# Patient Record
Sex: MALE | Race: BLACK OR AFRICAN AMERICAN | NOT HISPANIC OR LATINO | Employment: OTHER | URBAN - METROPOLITAN AREA
[De-identification: names, ages, dates, MRNs, and addresses within clinical notes are randomized per-mention and may not be internally consistent; named-entity substitution may affect disease eponyms.]

---

## 2017-08-22 ENCOUNTER — TELEPHONE (OUTPATIENT)
Dept: GASTROENTEROLOGY | Facility: CLINIC | Age: 67
End: 2017-08-22

## 2017-08-22 NOTE — TELEPHONE ENCOUNTER
----- Message from David Faith sent at 8/22/2017  9:51 AM CDT -----  Contact: Conor Go- [phone] (h) [phone] (c)  Needs to schedule colonoscipy

## 2017-08-24 ENCOUNTER — TELEPHONE (OUTPATIENT)
Dept: GASTROENTEROLOGY | Facility: CLINIC | Age: 67
End: 2017-08-24

## 2017-08-24 NOTE — TELEPHONE ENCOUNTER
Patient is scheduled for Colonoscopy at [facility] on 9/26/17,prep information was explained and mailed out. Patient verbalize understanding.

## 2017-08-24 NOTE — TELEPHONE ENCOUNTER
----- Message from Anitha Fulton sent at 8/24/2017  1:01 PM CDT -----  Contact: [facility], [phone]  States fax number provided to send medical records is not working. Please advise.

## 2017-09-29 ENCOUNTER — TELEPHONE (OUTPATIENT)
Dept: GASTROENTEROLOGY | Facility: CLINIC | Age: 67
End: 2017-09-29

## 2017-09-29 NOTE — TELEPHONE ENCOUNTER
----- Message from Hydrocapsule Clare sent at 9/28/2017  4:19 PM CDT -----  Contact: Dr. Hany Marie/[phone]  Dr. Marie requesting to speak with you concerning a report on this patient. Please call and advise.

## 2017-10-03 ENCOUNTER — OFFICE VISIT (OUTPATIENT)
Dept: GASTROENTEROLOGY | Facility: CLINIC | Age: 67
End: 2017-10-03
Payer: MEDICARE

## 2017-10-03 VITALS
BODY MASS INDEX: 29.26 KG/M2 | SYSTOLIC BLOOD PRESSURE: 137 MMHG | WEIGHT: 216 LBS | HEART RATE: 73 BPM | DIASTOLIC BLOOD PRESSURE: 82 MMHG | HEIGHT: 72 IN

## 2017-10-03 DIAGNOSIS — C18.7 MALIGNANT NEOPLASM OF SIGMOID COLON: Primary | ICD-10-CM

## 2017-10-03 PROCEDURE — 99214 OFFICE O/P EST MOD 30 MIN: CPT | Mod: S$GLB,,, | Performed by: INTERNAL MEDICINE

## 2017-10-03 PROCEDURE — 99999 PR PBB SHADOW E&M-EST. PATIENT-LVL II: CPT | Mod: PBBFAC,,, | Performed by: INTERNAL MEDICINE

## 2017-10-03 RX ORDER — AMLODIPINE BESYLATE 5 MG/1
5 TABLET ORAL DAILY
COMMUNITY

## 2017-10-03 RX ORDER — HYDRALAZINE HYDROCHLORIDE 50 MG/1
50 TABLET, FILM COATED ORAL 2 TIMES DAILY
COMMUNITY

## 2017-10-03 RX ORDER — SAW PALMETTO 160 MG
450 CAPSULE ORAL 2 TIMES DAILY
COMMUNITY

## 2017-10-03 RX ORDER — METOPROLOL TARTRATE 75 MG/1
50 TABLET, FILM COATED ORAL 2 TIMES DAILY
COMMUNITY

## 2017-10-03 RX ORDER — CHOLECALCIFEROL (VITAMIN D3) 25 MCG
2000 TABLET ORAL DAILY
COMMUNITY

## 2017-10-03 RX ORDER — AMOXICILLIN 500 MG
2 CAPSULE ORAL DAILY
COMMUNITY

## 2017-10-03 RX ORDER — METFORMIN HYDROCHLORIDE 500 MG/1
500 TABLET ORAL 2 TIMES DAILY WITH MEALS
COMMUNITY

## 2017-10-03 RX ORDER — HYDROCHLOROTHIAZIDE 25 MG/1
25 TABLET ORAL DAILY
COMMUNITY

## 2017-10-03 RX ORDER — LOSARTAN POTASSIUM 25 MG/1
25 TABLET ORAL DAILY
COMMUNITY

## 2017-10-03 RX ORDER — ASPIRIN 81 MG/1
81 TABLET ORAL DAILY
COMMUNITY

## 2017-10-03 NOTE — PATIENT INSTRUCTIONS
Colorectal Cancer Screening    Colorectal cancer (cancer in the colon or rectum) is a leading cause of cancer deaths in the [place] But it doesnt have to be. When this cancer is found and removed early, the chances of a full recovery are very good. Because colorectal cancer rarely causes symptoms in its early stages, screening for the disease is important. Its even more crucial if you have risk factors for the disease. Learn more about colorectal cancer and its risk factors. Then talk to your healthcare provider about being screened. You could be saving your own life.  Risk factors for colorectal cancer  Your risk of having colorectal cancer increases if you:  · Are 50 years of age or older  · Have a family history or personal history of colorectal cancer or polyps  · Have a personal history of type 2 diabetes, Crohns disease, or ulcerative colitis  · Have an inherited genetic syndrome like Nayak syndrome (also known as HNPCC) or familial adenomatous polyposis (FAP)  · Are very overweight  · Are not physically active  · Smoke  · Drink a lot of alcohol  · Eat a lot of red or processed meat  The colon and rectum  Waste from food you eat enters the colon from the small intestine. As it travels through the colon, the waste (stool) loses water and becomes more solid. Intestinal muscles push it toward the sigmoid--the last section of the colon. Stool then moves into the rectum, where its stored until its ready to leave the body during a bowel movement.  How cancer develops  Polyps are growths that form on the inner lining of the colon or rectum. Most are benign, which means they arent cancerous. But over time, some polyps can become cancer (malignant). This happens when cells in these polyps begin growing abnormally. In time, malignant cells invade more and more of the colon and rectum. The cancer may also spread to nearby organs or lymph nodes or to other parts of the body. Finding and removing polyps can help  prevent cancer from ever forming.  Your screening  Screening means looking for a health problem before you have symptoms. During screening for colorectal cancer, your healthcare provider will ask about your health history, examine you, and do one or more tests.  History and exam  The history and exam involve the following:  · Health history. Your healthcare provider will ask about your health history. Mention if a family member has had colon cancer or polyps. Also mention any health problems you have had in the past.  · Digital rectal exam (REMIGIO). During a REMIGIO, the healthcare provider inserts a lubricated gloved finger into the rectum. The test is painless and takes less than a minute. Healthcare providers agree that this test alone is not enough to screen for colorectal cancer.  Screening test choices  Fecal occult blood test (FOBT) or fecal immunochemical test (FIT)  These tests check for occult blood in stool (blood you cant see). Hidden blood may be a sign of colon polyps or cancer. A small sample of stool is tested for blood in a laboratory. Most often, you collect this sample at home using a kit your healthcare provider gives you. Follow the instructions carefully for using this kit. You might need to avoid certain foods and medicines before the test, as directed.  Barium enema with contrast (double-contrast barium enema)  This test uses X-rays to provide images of the entire colon and rectum. The day before this test, you will need to do a bowel prep to clean out the colon and rectum. A bowel prep is a liquid diet plus strong laxatives or enemas. You will be awake for the test, but you may be given medicine to help you relax. At the start of the test, a radiologist (a healthcare provider who specializes in imaging tests) places a soft tube into the rectum. The tube is used to fill the colon with a contrast liquid (barium) and air. This can be uncomfortable for some people. The liquid helps the colon show up  clearly on the X-rays. Because the test uses X-rays, it exposes you to a small amount of radiation.  Virtual colonoscopy  This exam is also called a CT colonography. It uses a series of X-ray photographs to create a 3-D view of the colon and rectum. The day before the test, you will need to do a bowel prep to clean out your colon. Your healthcare provider will give you instructions on how to do this. During the procedure, you will lie on a table that is part of a special X-ray machine called a CT scanner. A small tube will be placed into your rectum to fill the colon and rectum with air. This can be uncomfortable for some people. Then, the table will move into the machine and pictures will be taken of your colon and rectum. A computer will combine these photos to create a 3-D picture. Because the test uses X-rays, it exposes you to a small amount of radiation.  Scope exams  Here are two types of scope exams:  · Colonoscopy. This test can be used to find and remove polyps anywhere in the colon or rectum. The day before the test, you will do a bowel prep. This is a liquid diet plus a strong laxative solution or an enema. The bowel prep will cleanse your colon. You will be given instructions for this. Just before the test, you are given a medicine to make you sleepy. Then, a long, flexible, lighted tube called a colonoscope is gently inserted into the rectum and guided through the entire colon. Images of the colon are viewed on a video screen. Any polyps that are found are removed and sent to a lab for testing. If a polyp cant be removed, a sample of tissue is taken and the polyp might be removed later during surgery. You will need to bring someone with you to drive you home after this test.   · Sigmoidoscopy. This test is similar to colonoscopy, but focuses only on the sigmoid colon and rectum. As with colonoscopy, bowel prep must be done the day before this test. It might not need to be as complete as the bowel prep  for a colonoscopy. You are awake during the procedure, but you may be given medicine to help you relax. During the test, the healthcare provider guides a thin, flexible, lighted tube called a sigmoidoscope through your rectum and lower colon. The images are displayed on a video screen. Polyps are removed, if possible, and sent to a lab for testing.  Colonoscopy is the only screening test that lets your healthcare provider see the entire colon and rectum. This test also lets your healthcare provider remove any pieces of tissue that need to be looked at by a lab. If something suspicious is found using any other tests, you will likely need a colonoscopy.     When to call your healthcare provider after a test  Call your healthcare provider if you have any of the following after any screening test:  · Bleeding  · Fever of 100.4°F (38°C) or higher, or as directed by your healthcare provider  · Abdominal pain  · Vomiting   Date Last Reviewed: 11/4/2015  © 5066-3552 [employer]. [address] [place] [address], [place] [address]. All rights reserved. This information is not intended as a substitute for professional medical care. Always follow your healthcare professional's instructions.

## 2017-10-03 NOTE — PROGRESS NOTES
Subjective:      Patient ID: Conor Go is a 67 y.o. male.    Chief Complaint: Follow-up    HPI:   Patient presents for follow-up.  He underwent a routine follow-up colonoscopy September 26, 2017 at [facility].  A small right colon adenoma was removed.    A small flat polypoid lesion at 45 cm did not raise with saline injection.  The site was tattooed   Biopsies subsequently returned positive for moderately differentiated adenocarcinoma.  I reviewed these findings with the patient and his spouse today.  They utilize [facility] and we will select a surgeon from our [facility].  I provided a digital copy of the path report and will assist him in follow-up   Patient has a history of colon adenomas.  His prior colonoscopy was June 2012.  A small adenoma was removed.  GI systems review is negative.    Past history includes hypertension.  Prior hip replacement.  Takes low-dose aspirin.  Family history negative for GI neoplasm  Nonsmoker.  Nondrinker.    Review of patient's allergies indicates:  No Known Allergies  No past medical history on file.  Past Surgical History:   Procedure Laterality Date    COLONOSCOPY       No family history on file.  Social History     Social History    Marital status:      Spouse name: N/A    Number of children: N/A    Years of education: N/A     Occupational History    Not on file.     Social History Main Topics    Smoking status: Not on file    Smokeless tobacco: Not on file    Alcohol use Not on file    Drug use: Unknown    Sexual activity: Not on file     Other Topics Concern    Not on file     Social History Narrative    No narrative on file       Review of Systems:  Constitutional: Negative for appetite change.   HENT: Negative for trouble swallowing.   Eyes: Negative for photophobia.   Respiratory: Negative for cough and shortness of breath.   Cardiovascular: Negative for palpitations.   Gastrointestinal: See HPI  for details.  Genitourinary: Negative for frequency and hematuria.   Skin: Negative for rash.   Neurological: Negative for weakness and headaches.   Hematological: Negative.   Psychiatric/Behavioral: Negative for suicidal ideas and behavioral problems.     Objective:     /82 (BP Location: Right arm, Patient Position: Sitting)   Pulse 73   Ht 6' (1.829 m)   Wt 98 kg (216 lb)   BMI 29.29 kg/m²     Physical Exam:  Eyes: Pupils are equal, round, and reactive to light.   Neck: Supple. No mass  Cardiovascular: Regular rhythm . No murmur   Pulmonary/Chest: Lungs clear   Abdominal: Soft. No mass palpated. Nontender, no guarding. Positive bowel sounds   Musculoskeletal: No deformity. No edema.   Psychiatric: Alert and oriented    Assessment:     1. Malignant neoplasm of sigmoid colon      Plan:     Conor was seen today for follow-up.    Diagnoses and all orders for this visit:    Malignant neoplasm of sigmoid colon      Plan:  They indicate they have chosen [facility] [place] for surgery  provided patient with appropriate reports.  Will defer CT scan for the appointment with a surgeon    CC Dr. Hernandez

## 2017-10-04 ENCOUNTER — TELEPHONE (OUTPATIENT)
Dept: GASTROENTEROLOGY | Facility: CLINIC | Age: 67
End: 2017-10-04

## 2017-10-04 NOTE — TELEPHONE ENCOUNTER
Patient was seen in the office on 10/3/17 and received test results from  from Pathology results and given a copy of his report was given. A copy of the Pathology results was scan in under media.

## 2017-12-25 ENCOUNTER — HOSPITAL ENCOUNTER (EMERGENCY)
Facility: HOSPITAL | Age: 67
Discharge: HOME | End: 2017-12-25
Attending: EMERGENCY MEDICINE
Payer: MEDICARE

## 2017-12-25 VITALS
WEIGHT: 205 LBS | OXYGEN SATURATION: 98 % | BODY MASS INDEX: 27.77 KG/M2 | RESPIRATION RATE: 17 BRPM | DIASTOLIC BLOOD PRESSURE: 85 MMHG | HEIGHT: 72 IN | HEART RATE: 80 BPM | TEMPERATURE: 99 F | SYSTOLIC BLOOD PRESSURE: 130 MMHG

## 2017-12-25 DIAGNOSIS — J11.1 INFLUENZA: ICD-10-CM

## 2017-12-25 DIAGNOSIS — J06.9 UPPER RESPIRATORY TRACT INFECTION, UNSPECIFIED TYPE: Primary | ICD-10-CM

## 2017-12-25 DIAGNOSIS — R05.9 COUGH: ICD-10-CM

## 2017-12-25 LAB
FLUAV AG SPEC QL IA: POSITIVE
FLUBV AG SPEC QL IA: NEGATIVE
SPECIMEN SOURCE: ABNORMAL

## 2017-12-25 PROCEDURE — 63600175 PHARM REV CODE 636 W HCPCS: Performed by: EMERGENCY MEDICINE

## 2017-12-25 PROCEDURE — 99283 EMERGENCY DEPT VISIT LOW MDM: CPT | Mod: 25

## 2017-12-25 PROCEDURE — 25000003 PHARM REV CODE 250: Performed by: EMERGENCY MEDICINE

## 2017-12-25 PROCEDURE — 96372 THER/PROPH/DIAG INJ SC/IM: CPT

## 2017-12-25 PROCEDURE — 87400 INFLUENZA A/B EACH AG IA: CPT | Mod: 59

## 2017-12-25 RX ORDER — ONDANSETRON 4 MG/1
4 TABLET, FILM COATED ORAL EVERY 6 HOURS PRN
Qty: 10 TABLET | Refills: 0 | Status: SHIPPED | OUTPATIENT
Start: 2017-12-25

## 2017-12-25 RX ORDER — ONDANSETRON 4 MG/1
4 TABLET, ORALLY DISINTEGRATING ORAL
Status: COMPLETED | OUTPATIENT
Start: 2017-12-25 | End: 2017-12-25

## 2017-12-25 RX ORDER — ACETAMINOPHEN 500 MG
1000 TABLET ORAL
Status: COMPLETED | OUTPATIENT
Start: 2017-12-25 | End: 2017-12-25

## 2017-12-25 RX ORDER — BENZONATATE 100 MG/1
100 CAPSULE ORAL 3 TIMES DAILY PRN
Qty: 14 CAPSULE | Refills: 0 | Status: SHIPPED | OUTPATIENT
Start: 2017-12-25

## 2017-12-25 RX ORDER — KETOROLAC TROMETHAMINE 30 MG/ML
30 INJECTION, SOLUTION INTRAMUSCULAR; INTRAVENOUS
Status: COMPLETED | OUTPATIENT
Start: 2017-12-25 | End: 2017-12-25

## 2017-12-25 RX ADMIN — ONDANSETRON 4 MG: 4 TABLET, ORALLY DISINTEGRATING ORAL at 05:12

## 2017-12-25 RX ADMIN — KETOROLAC TROMETHAMINE 30 MG: 30 INJECTION, SOLUTION INTRAMUSCULAR at 05:12

## 2017-12-25 RX ADMIN — ACETAMINOPHEN 1000 MG: 500 TABLET ORAL at 05:12

## 2017-12-25 NOTE — ED PROVIDER NOTES
Encounter Date: 12/25/2017    SCRIBE #1 NOTE: I, Zeferino Bernal, am scribing for, and in the presence of,  Dr. Tony Conde. I have scribed the entire note.       History     Chief Complaint   Patient presents with    Influenza     67y M ambulatory to ED with c/o cough, nasal congestion x 3 days. N/V and fever starting yesterday. pt reports chronic abdominal pain from colon resection for colon CA in October. pt has not started chemo yet     67-year-old male presents emergency department complaining of nasal congestion, headache, sore throat, myalgias, cough.  He reports onset about 3 days ago.  Reports it is worsened over the past 1-2 days.  Minimal improvement with over-the-counter medications.  Reports his headache is worse with cough.  It began gradually and is now a generalized, throbbing headache that is constant without any other exacerbating alleviating factors reported.  Reports a sore throat that is worse with cough and swelling.  No alleviating factors reported.  Reports his cough is minimally productive of clear sputum.  He reports nausea and decreased appetite with one or 2 episodes of nonbloody, nonbilious emesis yesterday.  Reports subjective fever at home unmeasured.  No other symptoms reported.  Denies any lightheadedness, dizziness, chest pain, shortness of breath.       The history is provided by the patient.     Review of patient's allergies indicates:  No Known Allergies  Past Medical History:   Diagnosis Date    Cancer     colon    Diabetes mellitus     Hypertension      Past Surgical History:   Procedure Laterality Date    COLON SURGERY      resection 10/2017    COLONOSCOPY      JOINT REPLACEMENT      hip     History reviewed. No pertinent family history.  Social History   Substance Use Topics    Smoking status: Never Smoker    Smokeless tobacco: Not on file    Alcohol use No     Review of Systems   Constitutional: Positive for fever (subjective). Negative for chills and fatigue.         Malaise   HENT: Positive for congestion, sinus pain, sinus pressure and sore throat. Negative for voice change.    Eyes: Negative for photophobia, pain and redness.   Respiratory: Positive for cough. Negative for choking and shortness of breath.    Cardiovascular: Negative for chest pain, palpitations and leg swelling.   Gastrointestinal: Positive for nausea and vomiting. Negative for abdominal pain, blood in stool and diarrhea.   Genitourinary: Negative for dysuria, frequency and urgency.   Musculoskeletal: Negative for back pain, neck pain and neck stiffness.   Neurological: Positive for headaches. Negative for seizures, speech difficulty, light-headedness and numbness.   All other systems reviewed and are negative.      Physical Exam     Initial Vitals [12/25/17 0449]   BP Pulse Resp Temp SpO2   138/79 82 18 99.5 °F (37.5 °C) 97 %      MAP       98.67         Physical Exam    Nursing note and vitals reviewed.  Constitutional: He appears well-developed and well-nourished. No distress.   HENT:   Head: Normocephalic and atraumatic.   Erythematous oropharynx, no exudate, no swelling, Dry MM   Eyes: Conjunctivae and EOM are normal. Pupils are equal, round, and reactive to light.   Neck: Normal range of motion. Neck supple. No tracheal deviation present.   Cardiovascular: Normal rate, regular rhythm, normal heart sounds and intact distal pulses.   Pulmonary/Chest: Breath sounds normal. No respiratory distress. He has no wheezes. He has no rhonchi. He has no rales.   Abdominal: Soft. Bowel sounds are normal. He exhibits no distension. There is no tenderness. There is no rebound and no guarding.   Musculoskeletal: Normal range of motion. He exhibits no tenderness.   Neurological: He is alert and oriented to person, place, and time. He has normal strength. No cranial nerve deficit or sensory deficit.   Skin: Skin is warm and dry. Capillary refill takes less than 2 seconds.   Psychiatric: He has a normal mood and  affect.         ED Course   Procedures  Labs Reviewed   INFLUENZA A AND B ANTIGEN          X-Rays:   Independently Interpreted Readings:   Chest X-Ray: Chest x-ray interpreted by radiologist and visualized by me:     Imaging Results          X-Ray Chest PA And Lateral (Final result)  Result time 12/25/17 05:30:14    Final result by Stephanie Spicer MD (12/25/17 05:30:14)                 Impression:      No evidence of acute intrathoracic process on this single radiographic view of the chest.      Electronically signed by: STEPHANIE SPICER  Date:     12/25/17  Time:    05:30              Narrative:    Comparison: None available    Technique: Single AP chest radiograph.    Findings: There is a right-sided chest port in place with catheter tip projecting over the SVC. The visualized airway is unremarkable.  The lungs appear symmetrically aerated without definite focal alveolar consolidation.  There is minimal bibasilar atelectasis No large pleural effusion or pneumothorax is appreciated.  Osseous structures demonstrate no acute abnormality.                              Medical Decision Making:   Initial Assessment:   66 y/o M presents to the ED c/o subjective fever, cough, congestion, myalgias, headache  Differential Diagnosis:   Pneumonia, dehydration, URI, influenza  Independently Interpreted Test(s):   I have ordered and independently interpreted X-rays - see prior notes.  Clinical Tests:   Lab Tests: Reviewed       <> Summary of Lab: Flu+  ED Management:  Patient given Zofran, Tylenol, Toradol.  He is feeling much better.  We discussed disposition including discharge with prescriptions for Tessalon and Zofran, instructions to increase oral hydration with Gatorade G2 or Powerade light, follow-up with primary care physician, return with any new or worsening symptoms.  Patient expressed good understanding and is comfortable with discharge at this time.                      ED Course      Clinical Impression:   The primary  encounter diagnosis was Upper respiratory tract infection, unspecified type. Diagnoses of Cough and Influenza were also pertinent to this visit.    Disposition:   Disposition: Discharged  Condition: Stable   I, Dr. Tony Conde, personally performed the services described in this documentation. All medical record entries made by the scribe were at my direction and in my presence.  I have reviewed the chart and agree that the record reflects my personal performance and is accurate and complete. Tony Conde MD.  5:40 AM 12/25/2017                          Tony Conde MD  12/25/17 0613

## 2017-12-25 NOTE — ED NOTES
Pt. ambulatory to ED room with steady gait. Reports cough, cold, congestion and subjective fever x2 days. Took OTC analgesics PTA. One episode of n.v last night. Emesis consisted of undigested soup. Reports decreased appetite and fluid intake. Denies diarrhea. Reports regular BMs. Denies CP. Denies SOB. Denies any pain at present time. Denies abd pain at present time. Denies HA. Denies urinary symptoms. Denies sick contacts. NAD.   Hx of colon CA. Had bowel resection. Incision above umbilicus. Incision is well healed. No redness, swelling, warmth or pain near incision. Dry clean dressing intact. Dressing consist of band aid. Denies abd pain with palpation.

## 2017-12-25 NOTE — ED NOTES
Educated pt. on the prevention, transmission, signs symptoms and treatment of the flu. Pt and wife verbalize understanding.

## 2017-12-25 NOTE — ED NOTES
Educated parents at bedside on medication indication, AE, SE, reactions and expected outcomes. Parents verbalize understanding. Agrees to treatment plan. Denies allergy. Will continue to monitor.

## 2018-04-23 ENCOUNTER — OFFICE VISIT (OUTPATIENT)
Dept: OTOLARYNGOLOGY | Facility: CLINIC | Age: 68
End: 2018-04-23
Payer: MEDICARE

## 2018-04-23 VITALS
HEIGHT: 72 IN | WEIGHT: 192 LBS | DIASTOLIC BLOOD PRESSURE: 75 MMHG | BODY MASS INDEX: 26.01 KG/M2 | HEART RATE: 57 BPM | SYSTOLIC BLOOD PRESSURE: 119 MMHG | TEMPERATURE: 98 F

## 2018-04-23 DIAGNOSIS — H93.8X3 EAR FULLNESS, BILATERAL: ICD-10-CM

## 2018-04-23 DIAGNOSIS — H61.23 BILATERAL IMPACTED CERUMEN: ICD-10-CM

## 2018-04-23 PROCEDURE — 99213 OFFICE O/P EST LOW 20 MIN: CPT | Mod: S$GLB,,, | Performed by: OTOLARYNGOLOGY

## 2018-04-23 RX ORDER — FLUOROURACIL 50 MG/ML
800 INJECTION, SOLUTION INTRAVENOUS WEEKLY
COMMUNITY

## 2018-04-23 RX ORDER — FOLIC ACID 0.8 MG
800 TABLET ORAL DAILY
COMMUNITY

## 2018-04-23 RX ORDER — LEUCOVORIN CALCIUM 100 MG/10ML
800 INJECTION, POWDER, LYOPHILIZED, FOR SOLUTION INTRAMUSCULAR; INTRAVENOUS WEEKLY
COMMUNITY

## 2018-04-23 NOTE — PATIENT INSTRUCTIONS
Ear care reviewed and okay to use OTC Debrox every few days.  Follow up in one year unless problems prior.

## 2018-04-23 NOTE — PROGRESS NOTES
Subjective:       Patient ID: Conor Go is a 67 y.o. male.    Chief Complaint: Ear Fullness (yearly check)    He is an existing patient of mine last seen in April 2017.  He is here today with his wife for evaluation of his ears.  He has noted gradually decreasing / muffled hearing over the last several weeks.  There is a history of excessive cerumen and cerumen impactions in the past.  There has been no otalgia or otorrhea.  He states previously after ear cleaning his hearing is restored.  He denies acoustical trauma or tinnitus.  He denies any nasal throat or other otolaryngologic complaints.  Since his last visit with me he has unfortunately been diagnosed with colon cancer for which he is currently on a chemotherapeutic protocol at [facility] [place] and states doing very well with this.          Review of Systems   Ears: Positive for ear pressure.  Negative for hearing loss, ear pain, ringing in ear, ear discharge, ear infections, dizziness, head trauma, taken gentramycin/streptomycin and family history of hearing loss.    Nose:  Negative for nosebleeds, nasal obstruction, loss of smell and postnasal drip.    Mouth/Throat: Negative for pain swallowing, impaired swallowing, hoarse voice, throat mass, neck mass, oral ulcers and neck lumps.   Constitutional: Negative for recent unexplained weight loss and fever.    Eyes:  Negative for visual change.   Cardiovascular:  Negative for chest pain and palpitations.   Respiratory:  Negative for asthma, emphysema, history of tuberculosis, recent cough and shortness of breath.    Gastrointestinal:  Negative for history of stomach ulcers or pain, acid reflux and indigestion.   Other:  Negative for kidney problem, bladder problem, arthritis, rash, weakness, disturbances in coordination, slurred, confusion, anemia and persistent infections.           Objective:        Vitals:    04/23/18 1426   BP: 119/75   Pulse: (!) 57   Temp: 97.8 °F (36.6 °C)      Body mass index is 26.04 kg/m².  Physical Exam   Constitutional: He is oriented to person, place, and time. He appears well-developed and well-nourished. No distress.   HENT:   Head: Normocephalic and atraumatic.   Right Ear: Tympanic membrane and external ear normal.   Left Ear: Tympanic membrane and external ear normal.   Nose: No mucosal edema, rhinorrhea or nasal deformity. No epistaxis.   Mouth/Throat: Oropharynx is clear and moist. No oropharyngeal exudate.   Bilateral cerumen impaction.   Eyes: Conjunctivae and EOM are normal. Right eye exhibits no discharge. Left eye exhibits no discharge. No scleral icterus.   Neck: Normal range of motion. Neck supple. No tracheal deviation present. No thyromegaly present.   Pulmonary/Chest: Effort normal. No stridor. No respiratory distress.   Musculoskeletal: Normal range of motion. He exhibits no deformity.   Lymphadenopathy:     He has no cervical adenopathy.   Neurological: He is alert and oriented to person, place, and time. He displays no weakness. No cranial nerve deficit. Coordination normal.   Skin: Skin is warm and dry. No rash noted. He is not diaphoretic.   Psychiatric: He has a normal mood and affect. His behavior is normal. His speech is not slurred.       Tests / Results:        Assessment:       1. Ear fullness, bilateral    2. Bilateral impacted cerumen        Plan:        Ears cleaned with curet bilaterally and states ears feel better and open and clear now.  Ear care reviewed and answered questions.  Okay to use OTC Debrox ear wax drops every few days.  Follow up in one year and as needed.

## 2021-01-07 ENCOUNTER — OFFICE VISIT (OUTPATIENT)
Dept: OTOLARYNGOLOGY | Facility: CLINIC | Age: 71
End: 2021-01-07
Payer: MEDICARE

## 2021-01-07 VITALS
DIASTOLIC BLOOD PRESSURE: 82 MMHG | WEIGHT: 212.63 LBS | HEIGHT: 72 IN | HEART RATE: 62 BPM | SYSTOLIC BLOOD PRESSURE: 143 MMHG | BODY MASS INDEX: 28.8 KG/M2 | TEMPERATURE: 98 F

## 2021-01-07 DIAGNOSIS — H61.23 BILATERAL IMPACTED CERUMEN: ICD-10-CM

## 2021-01-07 DIAGNOSIS — H93.8X3 STUFFY EARS, BILATERAL: ICD-10-CM

## 2021-01-07 PROCEDURE — 99213 PR OFFICE/OUTPT VISIT, EST, LEVL III, 20-29 MIN: ICD-10-PCS | Mod: S$GLB,,, | Performed by: OTOLARYNGOLOGY

## 2021-01-07 PROCEDURE — 99213 OFFICE O/P EST LOW 20 MIN: CPT | Mod: S$GLB,,, | Performed by: OTOLARYNGOLOGY

## 2021-01-07 PROCEDURE — 1159F MED LIST DOCD IN RCRD: CPT | Mod: S$GLB,,, | Performed by: OTOLARYNGOLOGY

## 2021-01-07 PROCEDURE — 3008F BODY MASS INDEX DOCD: CPT | Mod: CPTII,S$GLB,, | Performed by: OTOLARYNGOLOGY

## 2021-01-07 PROCEDURE — 1159F PR MEDICATION LIST DOCUMENTED IN MEDICAL RECORD: ICD-10-PCS | Mod: S$GLB,,, | Performed by: OTOLARYNGOLOGY

## 2021-01-07 PROCEDURE — 3008F PR BODY MASS INDEX (BMI) DOCUMENTED: ICD-10-PCS | Mod: CPTII,S$GLB,, | Performed by: OTOLARYNGOLOGY

## 2021-01-07 RX ORDER — ESCITALOPRAM OXALATE 10 MG/1
10 TABLET ORAL DAILY
COMMUNITY

## 2022-04-06 ENCOUNTER — OFFICE VISIT (OUTPATIENT)
Dept: OTOLARYNGOLOGY | Facility: CLINIC | Age: 72
End: 2022-04-06
Payer: MEDICARE

## 2022-04-06 VITALS
SYSTOLIC BLOOD PRESSURE: 121 MMHG | DIASTOLIC BLOOD PRESSURE: 71 MMHG | TEMPERATURE: 98 F | BODY MASS INDEX: 28.76 KG/M2 | WEIGHT: 212.38 LBS | HEIGHT: 72 IN | HEART RATE: 75 BPM

## 2022-04-06 DIAGNOSIS — H91.93 DIFFICULTY HEARING, BILATERAL: ICD-10-CM

## 2022-04-06 DIAGNOSIS — H61.23 BILATERAL IMPACTED CERUMEN: ICD-10-CM

## 2022-04-06 DIAGNOSIS — Z77.122 HISTORY OF EXPOSURE TO NOISE: ICD-10-CM

## 2022-04-06 DIAGNOSIS — H93.8X3 STUFFY EARS, BILATERAL: ICD-10-CM

## 2022-04-06 PROCEDURE — 1160F PR REVIEW ALL MEDS BY PRESCRIBER/CLIN PHARMACIST DOCUMENTED: ICD-10-PCS | Mod: CPTII,S$GLB,, | Performed by: OTOLARYNGOLOGY

## 2022-04-06 PROCEDURE — 99213 PR OFFICE/OUTPT VISIT, EST, LEVL III, 20-29 MIN: ICD-10-PCS | Mod: S$GLB,,, | Performed by: OTOLARYNGOLOGY

## 2022-04-06 PROCEDURE — 99213 OFFICE O/P EST LOW 20 MIN: CPT | Mod: S$GLB,,, | Performed by: OTOLARYNGOLOGY

## 2022-04-06 PROCEDURE — 3078F PR MOST RECENT DIASTOLIC BLOOD PRESSURE < 80 MM HG: ICD-10-PCS | Mod: CPTII,S$GLB,, | Performed by: OTOLARYNGOLOGY

## 2022-04-06 PROCEDURE — 3074F PR MOST RECENT SYSTOLIC BLOOD PRESSURE < 130 MM HG: ICD-10-PCS | Mod: CPTII,S$GLB,, | Performed by: OTOLARYNGOLOGY

## 2022-04-06 PROCEDURE — 1159F MED LIST DOCD IN RCRD: CPT | Mod: CPTII,S$GLB,, | Performed by: OTOLARYNGOLOGY

## 2022-04-06 PROCEDURE — 3078F DIAST BP <80 MM HG: CPT | Mod: CPTII,S$GLB,, | Performed by: OTOLARYNGOLOGY

## 2022-04-06 PROCEDURE — 3074F SYST BP LT 130 MM HG: CPT | Mod: CPTII,S$GLB,, | Performed by: OTOLARYNGOLOGY

## 2022-04-06 PROCEDURE — 3008F PR BODY MASS INDEX (BMI) DOCUMENTED: ICD-10-PCS | Mod: CPTII,S$GLB,, | Performed by: OTOLARYNGOLOGY

## 2022-04-06 PROCEDURE — 1160F RVW MEDS BY RX/DR IN RCRD: CPT | Mod: CPTII,S$GLB,, | Performed by: OTOLARYNGOLOGY

## 2022-04-06 PROCEDURE — 1159F PR MEDICATION LIST DOCUMENTED IN MEDICAL RECORD: ICD-10-PCS | Mod: CPTII,S$GLB,, | Performed by: OTOLARYNGOLOGY

## 2022-04-06 PROCEDURE — 3008F BODY MASS INDEX DOCD: CPT | Mod: CPTII,S$GLB,, | Performed by: OTOLARYNGOLOGY

## 2022-04-07 NOTE — PROGRESS NOTES
Subjective:       Patient ID: Conor Go is a 71 y.o. male.    Chief Complaint: Ear Fullness (Add on/fit in / check, clean ears.)    Walk-in today with his wife, states thought he had an appointment to have his ears checked and cleaned.  Has history of recurring cerumen impactions, last seen for this and cleaned January 2021, after which reported resolution of ear complaints and deferred audiogram.  Has had gradual recurrence of stuffy, blocked ears over the past several months, and wife complains he's not hearing well.  Occasionally uses Debrox.  Wears ear buds regularly.  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  No otalgia, otorrhea, tinnitus, fluctuations, spinning.  No nasal or throat or other otolaryngologic complaints.            Review of Systems     Constitutional: Negative for fever.      HENT: Positive for hearing loss.  Negative for ear discharge, ear pain, ringing in the ears, sore throat, stuffy nose and voice change.      Respiratory:  Negative for cough and shortness of breath.      Cardiovascular:  Negative for chest pain.     Neurological: Negative for dizziness.      Hematologic: Negative for swollen glands.                Objective:        Vitals:    04/06/22 1328   BP: 121/71   Pulse: 75   Temp: 97.9 °F (36.6 °C)     Body mass index is 28.81 kg/m².  Physical Exam  Constitutional:       General: He is not in acute distress.     Appearance: He is well-developed.   HENT:      Head: Normocephalic and atraumatic.      Right Ear: Tympanic membrane and external ear normal. There is impacted cerumen.      Left Ear: Tympanic membrane and external ear normal. There is impacted cerumen.      Nose: No nasal deformity, mucosal edema or rhinorrhea.      Mouth/Throat:      Mouth: Mucous membranes are moist.      Pharynx: No pharyngeal swelling, oropharyngeal exudate or posterior oropharyngeal erythema.   Neck:      Trachea: Phonation normal.   Pulmonary:      Effort:  Pulmonary effort is normal. No respiratory distress.   Lymphadenopathy:      Cervical: No cervical adenopathy.   Skin:     General: Skin is warm and dry.   Neurological:      Mental Status: He is alert and oriented to person, place, and time.   Psychiatric:         Speech: Speech normal.         Behavior: Behavior normal.         Tests / Results:        Assessment:       1. Stuffy ears, bilateral    2. Difficulty hearing, bilateral    3. Bilateral impacted cerumen    4. History of exposure to noise        Plan:       Bilateral cerumen impactions were cleared with a combination of micro instrumentation and tolerated well revealing otherwise canals and TMs within normal limits AU.  Audiogram discussed and will return for this with follow-up next few weeks.

## 2022-05-02 ENCOUNTER — OFFICE VISIT (OUTPATIENT)
Dept: OTOLARYNGOLOGY | Facility: CLINIC | Age: 72
End: 2022-05-02
Payer: MEDICARE

## 2022-05-02 ENCOUNTER — CLINICAL SUPPORT (OUTPATIENT)
Dept: OTOLARYNGOLOGY | Facility: CLINIC | Age: 72
End: 2022-05-02
Payer: MEDICARE

## 2022-05-02 VITALS
BODY MASS INDEX: 28.86 KG/M2 | HEART RATE: 57 BPM | WEIGHT: 212.81 LBS | SYSTOLIC BLOOD PRESSURE: 133 MMHG | DIASTOLIC BLOOD PRESSURE: 78 MMHG | TEMPERATURE: 98 F

## 2022-05-02 DIAGNOSIS — Z86.69 HISTORY OF IMPACTED CERUMEN: ICD-10-CM

## 2022-05-02 DIAGNOSIS — H90.41 SENSORINEURAL HEARING LOSS (SNHL) OF RIGHT EAR WITH UNRESTRICTED HEARING OF LEFT EAR: Primary | ICD-10-CM

## 2022-05-02 DIAGNOSIS — Z77.122 HISTORY OF EXPOSURE TO NOISE: ICD-10-CM

## 2022-05-02 DIAGNOSIS — H90.41 SENSORINEURAL HEARING LOSS (SNHL) OF RIGHT EAR WITH UNRESTRICTED HEARING OF LEFT EAR: ICD-10-CM

## 2022-05-02 PROCEDURE — 3008F PR BODY MASS INDEX (BMI) DOCUMENTED: ICD-10-PCS | Mod: CPTII,S$GLB,, | Performed by: OTOLARYNGOLOGY

## 2022-05-02 PROCEDURE — 92567 TYMPANOMETRY: CPT | Mod: S$GLB,,, | Performed by: AUDIOLOGIST

## 2022-05-02 PROCEDURE — 1159F PR MEDICATION LIST DOCUMENTED IN MEDICAL RECORD: ICD-10-PCS | Mod: CPTII,S$GLB,, | Performed by: OTOLARYNGOLOGY

## 2022-05-02 PROCEDURE — 1159F MED LIST DOCD IN RCRD: CPT | Mod: CPTII,S$GLB,, | Performed by: OTOLARYNGOLOGY

## 2022-05-02 PROCEDURE — 3078F DIAST BP <80 MM HG: CPT | Mod: CPTII,S$GLB,, | Performed by: OTOLARYNGOLOGY

## 2022-05-02 PROCEDURE — 92567 PR TYMPA2METRY: ICD-10-PCS | Mod: S$GLB,,, | Performed by: AUDIOLOGIST

## 2022-05-02 PROCEDURE — 1160F PR REVIEW ALL MEDS BY PRESCRIBER/CLIN PHARMACIST DOCUMENTED: ICD-10-PCS | Mod: CPTII,S$GLB,, | Performed by: OTOLARYNGOLOGY

## 2022-05-02 PROCEDURE — 92557 PR COMPREHENSIVE HEARING TEST: ICD-10-PCS | Mod: S$GLB,,, | Performed by: AUDIOLOGIST

## 2022-05-02 PROCEDURE — 99214 PR OFFICE/OUTPT VISIT, EST, LEVL IV, 30-39 MIN: ICD-10-PCS | Mod: S$GLB,,, | Performed by: OTOLARYNGOLOGY

## 2022-05-02 PROCEDURE — 92557 COMPREHENSIVE HEARING TEST: CPT | Mod: S$GLB,,, | Performed by: AUDIOLOGIST

## 2022-05-02 PROCEDURE — 1160F RVW MEDS BY RX/DR IN RCRD: CPT | Mod: CPTII,S$GLB,, | Performed by: OTOLARYNGOLOGY

## 2022-05-02 PROCEDURE — 3075F PR MOST RECENT SYSTOLIC BLOOD PRESS GE 130-139MM HG: ICD-10-PCS | Mod: CPTII,S$GLB,, | Performed by: OTOLARYNGOLOGY

## 2022-05-02 PROCEDURE — 3075F SYST BP GE 130 - 139MM HG: CPT | Mod: CPTII,S$GLB,, | Performed by: OTOLARYNGOLOGY

## 2022-05-02 PROCEDURE — 3078F PR MOST RECENT DIASTOLIC BLOOD PRESSURE < 80 MM HG: ICD-10-PCS | Mod: CPTII,S$GLB,, | Performed by: OTOLARYNGOLOGY

## 2022-05-02 PROCEDURE — 3008F BODY MASS INDEX DOCD: CPT | Mod: CPTII,S$GLB,, | Performed by: OTOLARYNGOLOGY

## 2022-05-02 PROCEDURE — 99214 OFFICE O/P EST MOD 30 MIN: CPT | Mod: S$GLB,,, | Performed by: OTOLARYNGOLOGY

## 2022-05-02 NOTE — PROGRESS NOTES
Conor Go, a 71 y.o. male, was seen today in the clinic for an audiologic evaluation.  Patients main complaint was hearing loss.  Mr. Go reported that he has family members who feel that he is not hearing as well as he once did.  He denied otalgia, aural fullness, tinnitus, and vertigo.    Tympanometry revealed Type A in the right ear and Type A in the left ear. Audiogram results revealed mild sensorineural hearing loss (SNHL) raising to normal hearing in the right ear and normal hearing in the left ear.  Speech reception thresholds were noted at 25 dB in the right ear and 20 dB in the left ear.  Speech discrimination scores were 96% in the right ear and 100% in the left ear.    Recommendations:  1. Otologic evaluation  2. Annual audiogram  3. Hearing protection when in noise

## 2022-06-20 NOTE — PROGRESS NOTES
Subjective:       Patient ID: Conor Go is a 71 y.o. male.    Chief Complaint: Follow-up (and audio)    Last seen 04/06/2022 with history as follows:    Walk-in today with his wife, states thought he had an appointment to have his ears checked and cleaned.  Has history of recurring cerumen impactions, last seen for this and cleaned January 2021, after which reported resolution of ear complaints and deferred audiogram.  Has had gradual recurrence of stuffy, blocked ears over the past several months, and wife complains he's not hearing well.  Occasionally uses Debrox.  Wears ear buds regularly.  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  No otalgia, otorrhea, tinnitus, fluctuations, spinning.  No nasal or throat or other otolaryngologic complaints.     Interval history:     At his last visit bilateral cerumen impactions were noted and cleared and ears and hearing feel better since then.  Previously deferred audiologic testing but returns now to proceed.  No otalgia, otorrhea, tinnitus or other otologic complaints.  No nasal, throat, or other otolaryngologic complaints.        Review of Systems     Constitutional: Negative for appetite change, chills, fatigue, fever and unexpected weight loss.      HENT: Negative for ear discharge, ear infection, ear pain, facial swelling, hearing loss, mouth sores, nosebleeds, postnasal drip, ringing in the ears, runny nose, sinus infection, sinus pressure, sore throat, stuffy nose, tonsil infection, dental problems, trouble swallowing and voice change.      Eyes:  Negative for change in eyesight, eye drainage, eye itching and photophobia.     Respiratory:  Positive for sleep apnea.      Cardiovascular:  Negative for chest pain, foot swelling, irregular heartbeat and swollen veins.     Gastrointestinal:  Negative for abdominal pain, acid reflux, constipation, diarrhea, heartburn and vomiting.     Genitourinary: Negative for difficulty urinating,  sexual problems and frequent urination.     Musc: Negative for aching joints, aching muscles, back pain and neck pain.     Skin: Negative for rash.     Allergy: Positive for seasonal allergies.     Endocrine: Negative for cold intolerance and heat intolerance.      Neurological: Negative for dizziness, headaches, light-headedness, seizures and tremors.      Hematologic: Negative for bruises/bleeds easily and swollen glands.      Psychiatric: Negative for decreased concentration, depression, nervous/anxious and sleep disturbance.        Patient's self populated ROS above noted.        Objective:        Vitals:    05/02/22 1437   BP: 133/78   Pulse: (!) 57   Temp: 97.9 °F (36.6 °C)     Body mass index is 28.86 kg/m².  Physical Exam  Constitutional:       General: He is not in acute distress.     Appearance: He is well-developed.   HENT:      Head: Normocephalic and atraumatic.      Right Ear: Tympanic membrane, ear canal and external ear normal.      Left Ear: Tympanic membrane, ear canal and external ear normal.      Nose: No nasal deformity, mucosal edema or rhinorrhea.      Mouth/Throat:      Mouth: Mucous membranes are moist.      Pharynx: No pharyngeal swelling, oropharyngeal exudate or posterior oropharyngeal erythema.   Neck:      Trachea: Phonation normal.   Pulmonary:      Effort: Pulmonary effort is normal. No respiratory distress.   Lymphadenopathy:      Cervical: No cervical adenopathy.   Skin:     General: Skin is warm and dry.   Neurological:      Mental Status: He is alert and oriented to person, place, and time.   Psychiatric:         Speech: Speech normal.         Behavior: Behavior normal.         Tests / Results:                Assessment:       1. Sensorineural hearing loss (SNHL) of right ear with unrestricted hearing of left ear    2. History of exposure to noise    3. History of impacted cerumen        Plan:       Reviewed/discussed above audiogram which demonstrates normal thresholds left and  borderline mild sensorineural hearing loss right low frequency upsloping to normal, excellent speech discrimination bilaterally, normal tympanogram bilaterally.    Proper ear care and hearing protection reviewed.    Okay to use over-the-counter Debrox earwax drops every few days.    Follow-up 6 months unless change or problems prior.

## 2022-12-07 ENCOUNTER — OFFICE VISIT (OUTPATIENT)
Dept: OTOLARYNGOLOGY | Facility: CLINIC | Age: 72
End: 2022-12-07
Payer: MEDICARE

## 2022-12-07 VITALS
TEMPERATURE: 98 F | WEIGHT: 213 LBS | DIASTOLIC BLOOD PRESSURE: 74 MMHG | BODY MASS INDEX: 28.85 KG/M2 | SYSTOLIC BLOOD PRESSURE: 126 MMHG | HEIGHT: 72 IN | HEART RATE: 71 BPM

## 2022-12-07 DIAGNOSIS — H61.23 BILATERAL IMPACTED CERUMEN: ICD-10-CM

## 2022-12-07 DIAGNOSIS — H93.8X3 STUFFY EARS, BILATERAL: ICD-10-CM

## 2022-12-07 PROCEDURE — 1160F PR REVIEW ALL MEDS BY PRESCRIBER/CLIN PHARMACIST DOCUMENTED: ICD-10-PCS | Mod: CPTII,S$GLB,, | Performed by: OTOLARYNGOLOGY

## 2022-12-07 PROCEDURE — 1159F PR MEDICATION LIST DOCUMENTED IN MEDICAL RECORD: ICD-10-PCS | Mod: CPTII,S$GLB,, | Performed by: OTOLARYNGOLOGY

## 2022-12-07 PROCEDURE — 3078F PR MOST RECENT DIASTOLIC BLOOD PRESSURE < 80 MM HG: ICD-10-PCS | Mod: CPTII,S$GLB,, | Performed by: OTOLARYNGOLOGY

## 2022-12-07 PROCEDURE — 1159F MED LIST DOCD IN RCRD: CPT | Mod: CPTII,S$GLB,, | Performed by: OTOLARYNGOLOGY

## 2022-12-07 PROCEDURE — 3074F PR MOST RECENT SYSTOLIC BLOOD PRESSURE < 130 MM HG: ICD-10-PCS | Mod: CPTII,S$GLB,, | Performed by: OTOLARYNGOLOGY

## 2022-12-07 PROCEDURE — 3074F SYST BP LT 130 MM HG: CPT | Mod: CPTII,S$GLB,, | Performed by: OTOLARYNGOLOGY

## 2022-12-07 PROCEDURE — 1160F RVW MEDS BY RX/DR IN RCRD: CPT | Mod: CPTII,S$GLB,, | Performed by: OTOLARYNGOLOGY

## 2022-12-07 PROCEDURE — 3008F PR BODY MASS INDEX (BMI) DOCUMENTED: ICD-10-PCS | Mod: CPTII,S$GLB,, | Performed by: OTOLARYNGOLOGY

## 2022-12-07 PROCEDURE — 3078F DIAST BP <80 MM HG: CPT | Mod: CPTII,S$GLB,, | Performed by: OTOLARYNGOLOGY

## 2022-12-07 PROCEDURE — 3008F BODY MASS INDEX DOCD: CPT | Mod: CPTII,S$GLB,, | Performed by: OTOLARYNGOLOGY

## 2022-12-07 PROCEDURE — 99213 OFFICE O/P EST LOW 20 MIN: CPT | Mod: S$GLB,,, | Performed by: OTOLARYNGOLOGY

## 2022-12-07 PROCEDURE — 99213 PR OFFICE/OUTPT VISIT, EST, LEVL III, 20-29 MIN: ICD-10-PCS | Mod: S$GLB,,, | Performed by: OTOLARYNGOLOGY

## 2022-12-07 NOTE — PATIENT INSTRUCTIONS
Proper ear care and hearing protection.    Recheck 6 months for cleaning and audiogram unless change or problems prior.

## 2022-12-16 NOTE — PROGRESS NOTES
Subjective:       Patient ID: Conor Go is a 72 y.o. male.    Chief Complaint: Stuffy ears (Six-month follow-up to check/clean ears.)    Returns for follow-up to have his ears checked and cleaned.  Has history of recurring cerumen impactions last seen about 7 months ago with impactions requiring cleaning.  Some ear stuffiness, fullness gradually recurring.  No pain or drainage.  No other otolaryngologic complaints.        Review of Systems     Constitutional: Negative for fever.      HENT: Positive for hearing loss.  Negative for ear discharge, ear pain, sore throat and stuffy nose.      Respiratory:  Negative for cough and shortness of breath.      Cardiovascular:  Negative for chest pain.     Gastrointestinal:  Negative for abdominal pain.     Neurological: Negative for headaches.      Hematologic: Negative for swollen glands.              Objective:        Vitals:    12/07/22 1526   BP: 126/74   Pulse: 71   Temp: 97.9 °F (36.6 °C)     Body mass index is 28.89 kg/m².  Physical Exam  Constitutional:       General: He is not in acute distress.     Appearance: He is well-developed.   HENT:      Head: Normocephalic and atraumatic.      Right Ear: Tympanic membrane and external ear normal. There is impacted cerumen.      Left Ear: Tympanic membrane and external ear normal. There is impacted cerumen.      Nose: No nasal deformity, mucosal edema or rhinorrhea.      Mouth/Throat:      Mouth: Mucous membranes are moist.      Pharynx: No pharyngeal swelling, oropharyngeal exudate or posterior oropharyngeal erythema.   Neck:      Trachea: Phonation normal.   Pulmonary:      Effort: Pulmonary effort is normal. No respiratory distress.   Lymphadenopathy:      Cervical: No cervical adenopathy.   Skin:     General: Skin is warm and dry.   Neurological:      Mental Status: He is alert and oriented to person, place, and time.   Psychiatric:         Speech: Speech normal.         Behavior: Behavior normal.       Tests /  Results:    Audiogram done 05/02/2022 again noted.        Assessment:       1. Stuffy ears, bilateral    2. Bilateral impacted cerumen        Plan:       Bilateral cerumen impactions were cleared with a combination of micro instrumentation including curette and suction and tolerated well.  Ears feeling better upon completion of the procedure and otherwise within normal limits AU.      Proper ear care and hearing protection.    Recheck 6 months for cleaning and audiogram unless change or problems prior.

## 2023-08-02 ENCOUNTER — OFFICE VISIT (OUTPATIENT)
Dept: OTOLARYNGOLOGY | Facility: CLINIC | Age: 73
End: 2023-08-02
Payer: MEDICARE

## 2023-08-02 ENCOUNTER — CLINICAL SUPPORT (OUTPATIENT)
Dept: OTOLARYNGOLOGY | Facility: CLINIC | Age: 73
End: 2023-08-02
Payer: MEDICARE

## 2023-08-02 VITALS
WEIGHT: 212.38 LBS | SYSTOLIC BLOOD PRESSURE: 119 MMHG | HEIGHT: 72 IN | HEART RATE: 91 BPM | BODY MASS INDEX: 28.76 KG/M2 | TEMPERATURE: 98 F | DIASTOLIC BLOOD PRESSURE: 71 MMHG

## 2023-08-02 DIAGNOSIS — R42 DIZZINESS: ICD-10-CM

## 2023-08-02 DIAGNOSIS — H90.3 BILATERAL SENSORINEURAL HEARING LOSS: Primary | ICD-10-CM

## 2023-08-02 DIAGNOSIS — H61.23 EXCESSIVE CERUMEN IN EAR CANAL, BILATERAL: ICD-10-CM

## 2023-08-02 DIAGNOSIS — Z82.2 FAMILY HISTORY OF HEARING LOSS: ICD-10-CM

## 2023-08-02 DIAGNOSIS — Z77.122 HISTORY OF EXPOSURE TO NOISE: ICD-10-CM

## 2023-08-02 DIAGNOSIS — H91.90 HEARING DISORDER, UNSPECIFIED LATERALITY: Primary | ICD-10-CM

## 2023-08-02 DIAGNOSIS — Z86.69 HISTORY OF IMPACTED CERUMEN: ICD-10-CM

## 2023-08-02 DIAGNOSIS — H91.90 MILD HEARING LOSS: ICD-10-CM

## 2023-08-02 DIAGNOSIS — R29.2 ABNORMAL ACOUSTIC REFLEX: ICD-10-CM

## 2023-08-02 PROCEDURE — 92550 PR TYMPANOMETRY AND REFLEX THRESHOLD MEASUREMENTS: ICD-10-PCS | Mod: S$GLB,,, | Performed by: AUDIOLOGIST-HEARING AID FITTER

## 2023-08-02 PROCEDURE — 3074F SYST BP LT 130 MM HG: CPT | Mod: CPTII,S$GLB,, | Performed by: OTOLARYNGOLOGY

## 2023-08-02 PROCEDURE — 92557 PR COMPREHENSIVE HEARING TEST: ICD-10-PCS | Mod: S$GLB,,, | Performed by: AUDIOLOGIST-HEARING AID FITTER

## 2023-08-02 PROCEDURE — 92550 TYMPANOMETRY & REFLEX THRESH: CPT | Mod: S$GLB,,, | Performed by: AUDIOLOGIST-HEARING AID FITTER

## 2023-08-02 PROCEDURE — 92557 COMPREHENSIVE HEARING TEST: CPT | Mod: S$GLB,,, | Performed by: AUDIOLOGIST-HEARING AID FITTER

## 2023-08-02 PROCEDURE — 1159F MED LIST DOCD IN RCRD: CPT | Mod: CPTII,S$GLB,, | Performed by: OTOLARYNGOLOGY

## 2023-08-02 PROCEDURE — 3078F DIAST BP <80 MM HG: CPT | Mod: CPTII,S$GLB,, | Performed by: OTOLARYNGOLOGY

## 2023-08-02 PROCEDURE — 1160F RVW MEDS BY RX/DR IN RCRD: CPT | Mod: CPTII,S$GLB,, | Performed by: OTOLARYNGOLOGY

## 2023-08-02 PROCEDURE — 3008F BODY MASS INDEX DOCD: CPT | Mod: CPTII,S$GLB,, | Performed by: OTOLARYNGOLOGY

## 2023-08-02 PROCEDURE — 99213 OFFICE O/P EST LOW 20 MIN: CPT | Mod: S$GLB,,, | Performed by: OTOLARYNGOLOGY

## 2023-08-02 NOTE — PROGRESS NOTES
Rissa Reyes, CCC-A  Audiologist - [facility] [address] [place] [address]  [email or web address]  [phone]    Patient: Conor Go   MRN: [id number]  [address]   Home Phone [phone]   Work Phone Not on file.   Mobile [phone]   : 1950  ARCHIBALD: 2023      AUDIOLOGICAL EVALUATION      RECOMMENDATIONS:   It is recommended that Conor Go:  Follow up medically with Dr. Donahue.    Continue to receive audiological monitoring annually.  Use precaution and/or hearing protection in noisy environments.    If you should have any questions or concerns regarding the above information, please do not hesitate to contact me at [phone].      _______________________________  Rissa Reyes, JAYLEN-A  Audiologist

## 2024-01-03 NOTE — PROGRESS NOTES
Subjective:       Patient ID: Conor Go is a 73 y.o. male.    Chief Complaint: Follow-up (Check/clean ears and audiogram.)    Returns for follow-up.  Has history of recurring cerumen impactions, last seen and cleaned December 2022.  Also has history of normal to borderline hearing thresholds, last tested May 2022.  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  No ear complaints including no pain, drainage, stuffiness, tinnitus, fluctuations, spinning.  No nasal or throat or other otolaryngologic complaints.          Review of Systems     Constitutional: Negative for fever.      HENT: Positive for hearing loss.  Negative for ear discharge, ear pain, ringing in the ears, sore throat and stuffy nose.      Respiratory:  Negative for cough and shortness of breath.      Cardiovascular:  Negative for chest pain.     Gastrointestinal:  Negative for abdominal pain.     Allergy: Positive for seasonal allergies.     Neurological: Negative for dizziness.      Hematologic: Negative for swollen glands.                Objective:        Vitals:    08/02/23 1437   BP: 119/71   Pulse: 91   Temp: 97.7 °F (36.5 °C)     Body mass index is 28.81 kg/m².  Physical Exam  Constitutional:       General: He is not in acute distress.     Appearance: Normal appearance.   HENT:      Head: Normocephalic and atraumatic.      Right Ear: Tympanic membrane and external ear normal.      Left Ear: Tympanic membrane and external ear normal.      Ears:      Comments:   Excessive partially obstructing cerumen noted AU.     Nose: No nasal deformity, mucosal edema or rhinorrhea.      Mouth/Throat:      Mouth: Mucous membranes are moist.      Pharynx: No pharyngeal swelling, oropharyngeal exudate or posterior oropharyngeal erythema.   Neck:      Trachea: Phonation normal.   Pulmonary:      Effort: Pulmonary effort is normal. No respiratory distress.   Lymphadenopathy:      Cervical: No  cervical adenopathy.   Skin:     General: Skin is warm and dry.   Neurological:      Mental Status: He is alert and oriented to person, place, and time.   Psychiatric:         Speech: Speech normal.         Behavior: Behavior normal.         Tests / Results:                    Assessment:       1. Excessive cerumen in ear canal, bilateral    2. History of impacted cerumen    3. Mild hearing loss, bilateral    4. History of exposure to noise        Plan:       The ear canals were cleared of cerumen with a combination of micro instrumentation and tolerated well and otherwise canals and TMs within normal limits AU.      Audiologic testing was subsequently performed and discussed/reviewed.  This reveals normal to borderline/mild hearing loss with minimal air bone gap at few frequencies AU and excellent speech discrimination and type A tympanogram AU.    Continue proper ear care and hearing protection.    Follow-up one year for recheck/cleaning and audiogram unless change or problems prior.

## 2024-08-12 DIAGNOSIS — H91.90 HEARING DISORDER, UNSPECIFIED LATERALITY: Primary | ICD-10-CM

## 2024-08-14 ENCOUNTER — OFFICE VISIT (OUTPATIENT)
Dept: OTOLARYNGOLOGY | Facility: CLINIC | Age: 74
End: 2024-08-14
Payer: MEDICARE

## 2024-08-14 ENCOUNTER — CLINICAL SUPPORT (OUTPATIENT)
Dept: OTOLARYNGOLOGY | Facility: CLINIC | Age: 74
End: 2024-08-14
Payer: MEDICARE

## 2024-08-14 VITALS
WEIGHT: 212.81 LBS | TEMPERATURE: 98 F | HEART RATE: 72 BPM | BODY MASS INDEX: 28.82 KG/M2 | SYSTOLIC BLOOD PRESSURE: 134 MMHG | DIASTOLIC BLOOD PRESSURE: 80 MMHG | HEIGHT: 72 IN

## 2024-08-14 DIAGNOSIS — R29.2 ABNORMAL ACOUSTIC REFLEX: ICD-10-CM

## 2024-08-14 DIAGNOSIS — Z77.122 HISTORY OF EXPOSURE TO NOISE: ICD-10-CM

## 2024-08-14 DIAGNOSIS — Z82.2 FAMILY HISTORY OF HEARING LOSS: ICD-10-CM

## 2024-08-14 DIAGNOSIS — H61.22 IMPACTED CERUMEN, LEFT EAR: ICD-10-CM

## 2024-08-14 DIAGNOSIS — H90.0 CONDUCTIVE HEARING LOSS, BILATERAL: ICD-10-CM

## 2024-08-14 DIAGNOSIS — H61.21 EXCESSIVE CERUMEN IN EAR CANAL, RIGHT: ICD-10-CM

## 2024-08-14 DIAGNOSIS — R42 DIZZINESS: ICD-10-CM

## 2024-08-14 DIAGNOSIS — H90.0 CONDUCTIVE HEARING LOSS, BILATERAL: Primary | ICD-10-CM

## 2024-08-14 PROCEDURE — 3008F BODY MASS INDEX DOCD: CPT | Mod: CPTII,S$GLB,, | Performed by: OTOLARYNGOLOGY

## 2024-08-14 PROCEDURE — 3079F DIAST BP 80-89 MM HG: CPT | Mod: CPTII,S$GLB,, | Performed by: OTOLARYNGOLOGY

## 2024-08-14 PROCEDURE — 92550 TYMPANOMETRY & REFLEX THRESH: CPT | Mod: S$GLB,,, | Performed by: AUDIOLOGIST-HEARING AID FITTER

## 2024-08-14 PROCEDURE — 3075F SYST BP GE 130 - 139MM HG: CPT | Mod: CPTII,S$GLB,, | Performed by: OTOLARYNGOLOGY

## 2024-08-14 PROCEDURE — 99214 OFFICE O/P EST MOD 30 MIN: CPT | Mod: S$GLB,,, | Performed by: OTOLARYNGOLOGY

## 2024-08-14 PROCEDURE — 1160F RVW MEDS BY RX/DR IN RCRD: CPT | Mod: CPTII,S$GLB,, | Performed by: OTOLARYNGOLOGY

## 2024-08-14 PROCEDURE — 92557 COMPREHENSIVE HEARING TEST: CPT | Mod: S$GLB,,, | Performed by: AUDIOLOGIST-HEARING AID FITTER

## 2024-08-14 PROCEDURE — 1159F MED LIST DOCD IN RCRD: CPT | Mod: CPTII,S$GLB,, | Performed by: OTOLARYNGOLOGY

## 2024-08-14 NOTE — PATIENT INSTRUCTIONS
Hearing protection and proper ear care.    Follow up one year unless change or problems prior.

## 2024-08-15 NOTE — PROGRESS NOTES
Rissa Reyes, CCC-A  Audiologist - [facility] [address] [place] [address]  [email or web address]  [phone]    Patient: Conor Go   MRN: [id number]  [address]   Home Phone [phone]   Work Phone Not on file.   Mobile [phone]   : 1950  ARCHIBALD: 2024      AUDIOLOGICAL EVALUATION    Mr. Conor Go was seen in the clinic today for an audiological evaluation.  Mr. Go reported dizziness, family history of hearing loss, and history of exposure to noise.  Mr. Go denied tinnitus.    Audiological testing revealed a mild to moderate conductive hearing loss for the Right ear and a mild conductive hearing loss for the Left ear.  A speech reception threshold was obtained at 45 dB HL for the Right ear and at 35 dB HL for the Left ear.  Speech discrimination was 100% for the Right ear and 96% for the Left ear.      Tympanometry testing revealed a Type A tympanogram for the Right ear and a Type A tympanogram for the Left ear.        RECOMMENDATIONS:   It is recommended that Conor Go:  Follow up medically with Dr. Donahue.    Continue to receive audiological monitoring annually.  Use precaution and/or hearing protection in noisy environments.    If you should have any questions or concerns regarding the above information, please do not hesitate to contact me at [phone].      _______________________________  Rissa Reyes, JAYLEN-A  Audiologist

## 2025-01-24 NOTE — PROGRESS NOTES
Subjective:       Patient ID: Conor Go is a 74 y.o. male.    Chief Complaint: Follow-up (Check/clean ears and audiogram.)    Here today to have his ears checked/cleaned and update audiogram, last seen 08/02/2023 notes and prior reviewed.  States ears doing fine until the past 6 weeks or so began noting some water trapping.  No drops or other interventions.  No pain, drainage, tinnitus, fluctuations, spinning.  No ongoing acoustical trauma.  No nasal or throat or other otolaryngologic complaints.          Review of Systems     Constitutional: Negative for appetite change, chills, fatigue, fever and unexpected weight loss.      HENT: Negative for ear discharge, ear infection, ear pain, facial swelling, hearing loss, mouth sores, nosebleeds, postnasal drip, ringing in the ears, runny nose, sinus infection, sinus pressure, sore throat, stuffy nose, tonsil infection, dental problems, trouble swallowing and voice change.      Eyes:  Negative for change in eyesight, eye drainage, eye itching and photophobia.     Respiratory:  Negative for cough, shortness of breath, sleep apnea, snoring and wheezing.      Cardiovascular:  Negative for chest pain, foot swelling, irregular heartbeat and swollen veins.     Gastrointestinal:  Negative for abdominal pain, acid reflux, constipation, diarrhea, heartburn and vomiting.     Genitourinary: Negative for difficulty urinating, sexual problems and frequent urination.     Musc: Negative for aching joints, aching muscles, back pain and neck pain.     Skin: Negative for rash.     Allergy: Negative for food allergies and seasonal allergies.     Endocrine: Negative for cold intolerance and heat intolerance.      Neurological: Negative for dizziness, headaches, light-headedness, seizures and tremors.      Hematologic: Negative for bruises/bleeds easily and swollen glands.      Psychiatric: Negative for decreased concentration, depression, nervous/anxious and sleep disturbance.           Patient's self populated ROS above noted.          Objective:        Vitals:    08/14/24 1303   BP: 134/80   Pulse: 72   Temp: 98.2 °F (36.8 °C)     Body mass index is 28.86 kg/m².  Physical Exam  Constitutional:       General: He is not in acute distress.     Appearance: Normal appearance.   HENT:      Head: Normocephalic and atraumatic.      Right Ear: Tympanic membrane and external ear normal.      Left Ear: Tympanic membrane and external ear normal.      Ears:      Comments:   Left ear canal occluded by cerumen and right ear canal partially obstructed by excessive cerumen.     Nose: No nasal deformity, mucosal edema or rhinorrhea.      Mouth/Throat:      Mouth: Mucous membranes are moist.      Pharynx: No pharyngeal swelling, oropharyngeal exudate or posterior oropharyngeal erythema.   Neck:      Trachea: Phonation normal.   Pulmonary:      Effort: Pulmonary effort is normal. No respiratory distress.   Lymphadenopathy:      Cervical: No cervical adenopathy.   Skin:     General: Skin is warm and dry.   Neurological:      Mental Status: He is alert and oriented to person, place, and time.   Psychiatric:         Speech: Speech normal.         Behavior: Behavior normal.         Tests / Results:                      Assessment:       1. Conductive hearing loss, bilateral    2. Abnormal acoustic reflex    3. Impacted cerumen, left ear    4. Excessive cerumen in ear canal, right        Plan:       The ear canals were cleared of cerumen with a combination of micro instrumentation including curette, Domeboro wash, suction which was tolerated well and otherwise canals and TMs within normal limits AU.  Audiologic testing was subsequently performed.      Reviewed/discussed today's audiogram and compared to prior which demonstrates right air bone gap now at several frequencies of 10 to 20 dB, excellent speech discrimination AU, type A tymp AU, absent reflexes AU.    Reviewed all above and considerations, options,  answered questions.  Would like recheck one year unless change or problems prior.  Continue proper ear care and hearing protection.

## 2025-08-20 ENCOUNTER — TELEPHONE (OUTPATIENT)
Dept: OTOLARYNGOLOGY | Facility: CLINIC | Age: 75
End: 2025-08-20
Payer: MEDICARE